# Patient Record
Sex: FEMALE | Race: WHITE | NOT HISPANIC OR LATINO | URBAN - METROPOLITAN AREA
[De-identification: names, ages, dates, MRNs, and addresses within clinical notes are randomized per-mention and may not be internally consistent; named-entity substitution may affect disease eponyms.]

---

## 2021-01-11 ENCOUNTER — HOSPITAL ENCOUNTER (OUTPATIENT)
Dept: GENERAL RADIOLOGY | Facility: HOSPITAL | Age: 86
Discharge: HOME | End: 2021-01-11
Attending: INTERNAL MEDICINE

## 2021-02-02 ENCOUNTER — OFFICE VISIT CONVERTED (OUTPATIENT)
Dept: PULMONOLOGY | Facility: CLINIC | Age: 86
End: 2021-02-02
Attending: NURSE PRACTITIONER

## 2021-02-25 ENCOUNTER — HOSPITAL ENCOUNTER (OUTPATIENT)
Dept: GENERAL RADIOLOGY | Facility: HOSPITAL | Age: 86
Discharge: HOME | End: 2021-02-25
Attending: NURSE PRACTITIONER

## 2021-05-28 VITALS
OXYGEN SATURATION: 98 % | RESPIRATION RATE: 15 BRPM | WEIGHT: 99 LBS | BODY MASS INDEX: 18.22 KG/M2 | HEART RATE: 127 BPM | TEMPERATURE: 96.4 F | SYSTOLIC BLOOD PRESSURE: 159 MMHG | DIASTOLIC BLOOD PRESSURE: 78 MMHG | HEIGHT: 62 IN

## 2021-05-28 NOTE — PROGRESS NOTES
Patient: ZBIGNIEW DON     Acct: [id number]     Report: #[id number]  UNIT #: [id number]     : 1935    Encounter Date:2021  PRIMARY CARE: Lennox West  ***Signed***  --------------------------------------------------------------------------------------------------------------------  Chief Complaint      Encounter Date      2021            Primary Care Provider      PERCY PALOMO CFR            Referring Provider      PERCY PALOMO CFR            Patient Complaint      Patient is complaining of      PT here for [facility] F/U COVID + (20), Resp. Failure, Hypertension            VITALS      Height 5 ft 2 in / 157.48 cm      Weight 99 lbs  / 44.728872 kg      BSA 1.42 m2      BMI 18.1 kg/m2      Temperature 96.4 F / 35.78 C - Temporal      Pulse 127      Respirations 15      Blood Pressure 159/78 Sitting, Left Arm      Pulse Oximetry 98%, nasal canula , 2 lpm            HPI      The patient is a 85 year old female recently hospitalized at [facility] from  to 2020 for COVID-19 pneumonia. The patient has    a past medical history significant for diabetes, hypertension and presented to     the ER due to weakness and a sodium level of 115. Apparently this appears to be     a recurrent issue for her as she was given hypertonic saline and now normal     saline and nephrology was consulted. COVID-19 returned back positive and the pat    ient required oxygen and was initiated on decadron and Remdesivir. The continued    to need increased oxygen requirements and started on  vancomycin and cefepime on    2020 due to left upper lobe pneumonia. The patient completed antibiotics     therapy during her stay and transferred to ICU on 2020 due to worsening     hypoxia and pulmonary and critical care were consulted to assist with care. The     patient was initially on Airvo but weaned on high flow nasal cannula. The     patient had a code status change to DNR. The  patient did however improved     extremely slowly and had a prolonged hospital stay. Nephrology was consulted for    hyponatremia and her sodium stabilized during her stay and she was transferred     out of ICU on 12/22/2020 and was intermittently diuresed. Palliative care was     discussed with the family. The patient was on high flow nasal cannula so they     considered LTAC but it was decided that it was too far away and her family     considered hospice. The patient initially auscultated back and forth between     Airvo and high flow nasal cannula an fortunately she was able to be weaned down     to 2 liters per minute via nasal cannula prior to discharge. The family did meet    with hospice at that time and the family elected to take the patient home with     home health. The patient's  was not ready to make a decision at that     time. The patient was given a steroid taper at discharge due to prolonged hos    pitalization and 2 rounds of steroids given during her hospital. The patient     does have advanced dementia, significant weakness and debility and remains a     high likelihood of readmission. The patient's granddaughter and son are present     with the patient in the office today and providing history. The patient states     she is feeling better since her hospital stay, the patient still gets short of     air that is moderate in severity, worse with exertion, improved with rest. The     patient is on 2 liters per minute oxygen via nasal cannula. The patient's     granddaughter states the patient was seen by her primary care provider Dr. West     recently and her blood pressure medication has been stopped at that time. The     patient denies any fever or chills, night sweats, hemoptysis,  purulent sputum     production, swollen glands in head and neck, unintentional weight loss, chest     pain or chest tightness, abdominal pain, nausea or vomiting or diarrhea. The     patient denies  any  headaches, myalgias, sore throat, changes in sense of taste     and smell any coronavirus or flu like symptoms.  The patient denies any leg     swelling, orthopnea or paroxysmal nocturnal dyspnea.  The patient continues to     need oxygen at 2 liters per minute via nasal cannula. The patient states she had    a repeat chest x-ray done by Dr. West on 01/11/21 showing increasing bilateral     airspace opacities concerning for multifocal pneumonia with right lung opacities    that were new from prior.              I reviewed the Review of Systems, medical, surgical and family history and agree    with those as entered.            ROS      Constitutional:  Denies: Fatigue, Fever, Weight gain, Weight loss, Chills, Insom    johnson, Other      Respiratory/Breathing:  Complains of: Shortness of air, Cough; Denies: Wheezing,    Hemoptysis, Pleuritic pain, Other      Endocrine:  Denies: Polydipsia, Polyuria, Heat/cold intolerance, Abnorml     menstrual pattern, Diabetes, Other      Eyes:  Denies: Blurred vision, Vision Changes, Other      Ears, nose, mouth, throat:  Denies: Mouth lesions, Thrush, Throat pain,     Hoarseness, Allergies/Hay Fever, Post Nasal Drip, Headaches, Recent Head Injury,    Nose Bleeding, Neck Stiffness, Thyroid Mass, Hearing Loss, Ear Fullness, Dry     Mouth, Nasal or Sinus Pain, Dry Lips, Nasal discharge, Nasal congestion, Other      Cardiovascular:  Denies: Palpitations, Syncope, Claudication, Chest Pain, Wake     up Gasping for air, Leg Swelling, Irregular Heart Rate, Cyanosis, Dyspnea on     Exertion, Other      Gastrointestinal:  Denies: Nausea, Constipation, Diarrhea, Abdominal pain,     Vomiting, Difficulty Swallowing, Reflux/Heartburn, Dysphagia, Jaundice,     Bloating, Melena, Bloody stools, Other      Genitourinary:  Denies: Urinary frequency, Incontinence, Hematuria, Urgency,     Nocturia, Dysuria, Testicular problems, Other      Musculoskeletal:  Denies: Joint Pain, Joint Stiffness, Joint  Swelling, Myalgias,    Other      Hematologic/lymphatic:  DENIES: Lymphadenopathy, Bruising, Bleeding tendencies,     Other      Neurological:  Denies: Headache, Numbness, Weakness, Seizures, Other      Psychiatric:  Denies: Anxiety, Appropriate Effect, Depression, Other      Sleep:  No: Excessive daytime sleep, Morning Headache?, Snoring, Insomnia?, Stop    breathing at sleep?, Other      Integumentary:  Denies: Rash, Dry skin, Skin Warm to Touch, Other      Immunologic/Allergic:  Denies: Latex allergy, Seasonal allergies, Asthma,     Urticaria, Eczema, Other      Immunization status:  No: Up to date            FAMILY/SOCIAL/MEDICAL HX      Surgical History:  Yes: Orthopedic Surgery (hip), Other Surgeries; No: Abdominal    Surgery, Appendectomy, Bladder Surgery, Bowel Surgery, CABG, Cholecystectomy,     Head Surgery, Oral Surgery, Vascular Surgery      Diabetes - Family Hx:  Brother      Is Father Still Living?:  No      Is Mother Still Living?:  No      Smoking status:  Former smoker (quit 30-40 yrs )      Medical History:  Yes: Deafness or Ringing Ears, Diabetes, High Blood Pressure;     No: Arthritis, Asthma, Chemotherapy/Cancer, Chronic Bronchitis/COPD, Congestive     Heart Failu, Seizures, Heart Attack, Hemorrhoids/Rectal Prob, Shortness Of     Breath, Miscellaneous Medical/oth      Psychiatric History      None            PREVENTION      Hx Influenza Vaccination:  Yes      Date Influenza Vaccine Given:  Dec 31, 2020      2 or More Falls in Past Year?:  No      Fall Past Year with Injury?:  No      Hx Pneumococcal Vaccination:  No      Encouraged to follow-up with:  PCP regarding preventative exams.      Chart initiated by      Janelle Renee MA            ALLERGIES/MEDICATIONS      Allergies:        Coded Allergies:             NO KNOWN ALLERGIES (Unverified , 2/2/21)      Medications    Last Reconciled on 2/2/21 14:24 by JEAN WELCH,       Aspirin Chew (Aspirin Chew) 81 Mg Tab.chew      81 MG PO  QDAY, #30 TAB.CHEW 0 Refills         Prov: LILIANA GONZALEZ         12/28/20       MDI-Albuterol (Proair HFA) 8.5 Gm Hfa.aer.ad      2 PUFFS INH Q4H PRN for SHORTNESS OF BREATH, #1 MDI 0 Refills         Prov: LILIANA GONZALEZ         12/28/20       Simvastatin (Simvastatin*) 10 Mg Tablet      10 MG PO HS, TAB         Reported         2/21/14       metFORMIN HCl (metFORMIN HCl) 500 Mg Tablet      500 MG PO QDAY, TAB         Reported         2/20/14      Current Medications      Current Medications Reviewed 2/2/21            EXAM      Vital Signs Reviewed      Gen: WDWN, Alert, NAD.        HEENT:  PERRL, EOMI.  OP, nares clear, no sinus tenderness.      Neck:  Supple, no JVD, no thyromegaly.      Lymph: No axillary, cervical, supraclavicular lymphadenopathy noted bilaterally.      Chest:  Bilateral diminished breath sounds, no wheezing, crackles or rhonchi     appreciated, normal work of breathing noted. The patient is able to speak full     sentences without difficulty.        CV:  RRR, no MGR, pulses 2+, equal.      Abd:  Soft, NT, ND, + BS, no HSM.      EXT:  No clubbing, no cyanosis, no edema, no joint tenderness.       Neuro:  A  Skin: No rashes or lesions.      Vtials      Vitals:             Height 5 ft 2 in / 157.48 cm           Weight 99 lbs  / 44.251086 kg           BSA 1.42 m2           BMI 18.1 kg/m2           Temperature 96.4 F / 35.78 C - Temporal           Pulse 127           Respirations 15           Blood Pressure 159/78 Sitting, Left Arm           Pulse Oximetry 98%, nasal canula , 2 lpm            REVIEW      Results Reviewed      PCCS Results Reviewed?:  Yes Prev Lab Results, Yes Prev Radiology Results, Yes     Previous Mecial Records      Lab Results      I personally reviewed the patient's discharge summary from recent     hospitalization.      Radiographic Results               [facility]                PACS RADIOLOGY REPORT             Patient: ZBIGNIEW DON   Acct: #[id number]   Report: #[id number]            UNIT #: [id number]    DOS: 12/15/20 0647      INSURANCE:MEDICARE PART A   LOCATION:[address] [place], [place] [address]-   : 1935            PROVIDERS      ADMITTING:  Kev Disla   ATTENDING: Kev Disla      FAMILY:  Lennox West   ORDERING:  PRANAV WILKINS         OTHER:    DICTATING:  Gilles Sung MD            REQ #:[id number]   EXAM:CHWO - CT CHEST without CONTRAST      REASON FOR EXAM:  DYLAN infiltrate, ? loculated effusion      REASON FOR VISIT:  SEVERE HYPONATREMIA            *******Signed******         PROCEDURE:   CT CHEST WITHOUT CONTRAST             COMPARISON:   [facility], , CXR PORTABLE, 2020, 15:53.             INDICATIONS:   DYLAN infiltrate, ? loculated effusion             TECHNIQUE:   CT images were created without the administration of contrast     material.               PROTOCOL:     Standard imaging protocol performed                RADIATION:     DLP: 303mGy*cm          Automated exposure control was utilized to minimize radiation dose.              FINDINGS:         Chest CT demonstrates there are prominent mediastinal lymph nodes.  The largest     AP window lymph       node measures 0.9 x 0.7 centimeters.  There is prominent subcarinal lymph nodes     some which are       calcified.  No pericardial effusion.  There may be a small left pleural effusion    measuring up to       about 9 millimeters in thickness.  Contrast not utilized.  Mild atherosclerosis.     No upper       abdominal finding is seen.  There is dense airspace opacity in both lower lobes.     There is patchy       bilateral opacities and septal thickening of the ground-glass opacity that has a    crazy paving type       appearance.  This is most extensive peripherally in the left upper lobe.  There     is similar opacity       in the left lower lobe right upper lobe and right lower lobe and a small amount     of opacity in the        right middle lobe.  There is a small right lower lobe lung nodule measuring 4     millimeters.  No       acute osseous findings.             CONCLUSION:         1. Small left pleural effusion measuring up to about 0.9 centimeters in [facility]    kness.  No large or       loculated effusion.      2. Diffuse bilateral upper and lower lobe opacities greater in the left upper.      Much of this       opacity has a crazy paving appearance.  Differential considerations include     infectious and       noninfectious causes.  Infectious causes such as bacterial pneumonia, acute     interstitial pneumonia       and  viral pneumonia including COVID 19.  Non infectious causes may include     pulmonary edema and       ARDS.  Recommend continued follow-up to ensure resolution.      3. More dependent airspace opacities seen in bilateral lower lobes that could     reflect atelectasis       and/or pneumonia.      4. Small right lower lobe lung nodule.                ODALIS VEGA MD             Electronically Signed and Approved By: ODALIS VEGA MD on 12/15/2020 at     16:46                               Until signed, this is an unconfirmed preliminary report that may contain      errors and is subject to change.                                              SCHJO:      D:12/15/20 1622                     [facility]                [place]ACS RADIOLOGY REPORT            Patient: ZBIGNIEW DON   Acct: #[id number]   Report: #[id number]            UNIT #: [id number]    DOS: 21 1212      INSURANCE:MEDICARE PART A   LOCATION:[facility]     : 1935            PROVIDERS      ADMITTING:     ATTENDING: Lennox West      FAMILY:  NONE,MD   ORDERING:  Lennox West         OTHER:    DICTATING:  Tarik Pisano MD            REQ #:[id number]   EXAM:CXR2 - CHEST 2V AP PA LAT      REASON FOR EXAM:        REASON FOR VISIT:  PNEUMONIA            *******Signed******          PROCEDURE:   CHEST AP/PA AND LATERAL             COMPARISON:   [facility], CR, CHEST AP/PA 1 VIEW, 12/24/2020,     10:48.             INDICATIONS:   PNEUMONIA. HOSPITALIZED FROM DECEMBER 5TH TO DECEMBER 28TH LAST     YEAR. STILL HAVING       SHORTNESS AND BREATH AND IS ON OXYGEN NOW.             FINDINGS:         There is increasing right mid lung and right basilar opacity compared to prior     exam.  There is       persistent multifocal left mid lung and left basilar opacity.  Cardiac and     mediastinal contours are       within normal limits.  Regional skeleton is unremarkable.  No aggressive     appearing lytic or       sclerotic bone lesions.             CONCLUSION:         1. Increasing, bilateral airspace opacities concerning for multifocal pneumonia.     The right lung       opacities are new from prior.              RUMA EATON MD             Electronically Signed and Approved By: RUMA EATON MD on 1/11/2021 at 12:36                               Until signed, this is an unconfirmed preliminary report that may contain      errors and is subject to change.                                              DUNS1:      D:01/11/21 1236            Assessment      COVID-19 - U07.1            Dyspnea - R06.00            Notes      Renewed Medications      * MDI-Albuterol (Proair HFA) 8.5 GM HFA.AER.AD:         From: 2 PUFFS INH Q4H PRN SHORTNESS OF BREATH #1         To: 2 PUFFS INH Q4H PRN SHORTNESS OF BREATH 30 Days #1      Discontinued Medications      * predniSONE (Deltasone) 10 MG TABLET: 40 MG PO QDAY #30      New Diagnostics      * Chest W/O Cont CT, SCHEDULED PROCEDURE         Dx: COVID-19 - U07.1      IMPRESSION:      1. History of acute hypoxemic respiratory failure requiring Airvo and BiPAP now     on 2 liters per minute oxygen via nasal cannula.      2. COVID-19 pneumonia.       3. Acute cardiogenic pulmonary edema.       4. Diastolic heart failure.       5. Tiny left pleural effusion.        6. Hyponatremia resolved.       7. History of type II diabetes mellitus.      8. History of hypertension.       9. History of urinary tract infection.       10. Weakness.       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             PLAN:      1. Continue ProAir inhaler as needed.       2. I offered to write for a nebulizer machine and albuterol nebulizer to use at     home however the patient's granddaughter states the patient does not have     prescription insurance and would need to check with her insurance company and     notify our office.       3. The patient's heart rate is  tachycardic in the office today. The patient's     granddaughter states the patient is anxious being at the doctor office however I    advised them to call their primary care provider today at the patient's blood     pressure is slightly elevated and the patient need to be restarted on     medication. Both the patient's son and granddaughter verbalized understanding     and compliance.       4. The patient is advised to call the office, call 911 or go to the ER for any     new or worsening symptoms.       5. The patient reports she is up to date with flu and pneumonia vaccines. The     patient is advised to follow CDC recommendations such as social distancing,     wearing a mask and washing hand for at least 20 seconds.      6. Continue oxygen at 2 liters per minute to keep oxygen saturation at or above     89%.       7. I will repeat a CT scan of the chest to ensure resolution of recent     pneumonia.       8. Follow up with Dr. Adams in 4-6 weeks sooner if needed.            Patient Education      Patient Education Provided:  Acute Bronchitis      Time Spent:  > 50% /Coord Care            Electronically signed by JEAN WELCH [facility]  02/05/2021 12:55       Disclaimer: Converted document may not contain table formatting or lab diagrams. Please see [facility] for the authenticated document.

## 2023-09-18 ENCOUNTER — APPOINTMENT (OUTPATIENT)
Dept: GENERAL RADIOLOGY | Facility: HOSPITAL | Age: 88
End: 2023-09-18
Payer: MEDICARE

## 2023-09-18 ENCOUNTER — HOSPITAL ENCOUNTER (EMERGENCY)
Facility: HOSPITAL | Age: 88
Discharge: HOME | End: 2023-09-18
Attending: EMERGENCY MEDICINE | Admitting: EMERGENCY MEDICINE
Payer: MEDICARE

## 2023-09-18 VITALS
DIASTOLIC BLOOD PRESSURE: 53 MMHG | SYSTOLIC BLOOD PRESSURE: 146 MMHG | TEMPERATURE: 97.7 F | BODY MASS INDEX: 14.57 KG/M2 | HEIGHT: 64 IN | HEART RATE: 74 BPM | WEIGHT: 85.32 LBS | OXYGEN SATURATION: 98 % | RESPIRATION RATE: 19 BRPM

## 2023-09-18 DIAGNOSIS — F03.C0 SEVERE DEMENTIA, UNSPECIFIED DEMENTIA TYPE, UNSPECIFIED WHETHER BEHAVIORAL, PSYCHOTIC, OR MOOD DISTURBANCE OR ANXIETY: Primary | ICD-10-CM

## 2023-09-18 DIAGNOSIS — Z51.5 END OF LIFE CARE: ICD-10-CM

## 2023-09-18 LAB
ALBUMIN SERPL-MCNC: 2.6 G/DL (ref 3.5–5.2)
ALBUMIN/GLOB SERPL: 1.3 G/DL
ALP SERPL-CCNC: 102 U/L (ref 39–117)
ALT SERPL W P-5'-P-CCNC: 7 U/L (ref 1–33)
ANION GAP SERPL CALCULATED.3IONS-SCNC: 8.1 MMOL/L (ref 5–15)
AST SERPL-CCNC: 13 U/L (ref 1–32)
BASOPHILS # BLD AUTO: 0.03 10*3/MM3 (ref 0–0.2)
BASOPHILS NFR BLD AUTO: 0.3 % (ref 0–1.5)
BILIRUB SERPL-MCNC: 0.5 MG/DL (ref 0–1.2)
BILIRUB UR QL STRIP: NEGATIVE
BUN SERPL-MCNC: 12 MG/DL (ref 8–23)
BUN/CREAT SERPL: 42.9 (ref 7–25)
BURR CELLS BLD QL SMEAR: NORMAL
CALCIUM SPEC-SCNC: 8.2 MG/DL (ref 8.6–10.5)
CHLORIDE SERPL-SCNC: 91 MMOL/L (ref 98–107)
CLARITY UR: CLEAR
CO2 SERPL-SCNC: 27.9 MMOL/L (ref 22–29)
COLOR UR: YELLOW
CREAT SERPL-MCNC: 0.28 MG/DL (ref 0.57–1)
DEPRECATED RDW RBC AUTO: 47.2 FL (ref 37–54)
EGFRCR SERPLBLD CKD-EPI 2021: 103.9 ML/MIN/1.73
EOSINOPHIL # BLD AUTO: 0.02 10*3/MM3 (ref 0–0.4)
EOSINOPHIL NFR BLD AUTO: 0.2 % (ref 0.3–6.2)
ERYTHROCYTE [DISTWIDTH] IN BLOOD BY AUTOMATED COUNT: 19.5 % (ref 12.3–15.4)
GLOBULIN UR ELPH-MCNC: 2 GM/DL
GLUCOSE SERPL-MCNC: 109 MG/DL (ref 65–99)
GLUCOSE UR STRIP-MCNC: NEGATIVE MG/DL
HCT VFR BLD AUTO: 28.8 % (ref 34–46.6)
HGB BLD-MCNC: 8.5 G/DL (ref 12–15.9)
HGB UR QL STRIP.AUTO: NEGATIVE
HOLD SPECIMEN: NORMAL
HOLD SPECIMEN: NORMAL
IMM GRANULOCYTES # BLD AUTO: 0.07 10*3/MM3 (ref 0–0.05)
IMM GRANULOCYTES NFR BLD AUTO: 0.8 % (ref 0–0.5)
KETONES UR QL STRIP: ABNORMAL
LEUKOCYTE ESTERASE UR QL STRIP.AUTO: NEGATIVE
LYMPHOCYTES # BLD AUTO: 1.28 10*3/MM3 (ref 0.7–3.1)
LYMPHOCYTES NFR BLD AUTO: 14.2 % (ref 19.6–45.3)
MAGNESIUM SERPL-MCNC: 1.9 MG/DL (ref 1.6–2.4)
MCH RBC QN AUTO: 20.2 PG (ref 26.6–33)
MCHC RBC AUTO-ENTMCNC: 29.5 G/DL (ref 31.5–35.7)
MCV RBC AUTO: 68.4 FL (ref 79–97)
MICROCYTES BLD QL: NORMAL
MONOCYTES # BLD AUTO: 0.67 10*3/MM3 (ref 0.1–0.9)
MONOCYTES NFR BLD AUTO: 7.4 % (ref 5–12)
NEUTROPHILS NFR BLD AUTO: 6.96 10*3/MM3 (ref 1.7–7)
NEUTROPHILS NFR BLD AUTO: 77.1 % (ref 42.7–76)
NITRITE UR QL STRIP: NEGATIVE
NRBC BLD AUTO-RTO: 0 /100 WBC (ref 0–0.2)
OVALOCYTES BLD QL SMEAR: NORMAL
PH UR STRIP.AUTO: 7 [PH] (ref 5–8)
PLATELET # BLD AUTO: 252 10*3/MM3 (ref 140–450)
PMV BLD AUTO: 8.4 FL (ref 6–12)
POTASSIUM SERPL-SCNC: 3.7 MMOL/L (ref 3.5–5.2)
PROT SERPL-MCNC: 4.6 G/DL (ref 6–8.5)
PROT UR QL STRIP: ABNORMAL
RBC # BLD AUTO: 4.21 10*6/MM3 (ref 3.77–5.28)
SMALL PLATELETS BLD QL SMEAR: ADEQUATE
SODIUM SERPL-SCNC: 127 MMOL/L (ref 136–145)
SP GR UR STRIP: 1.01 (ref 1–1.03)
TROPONIN T SERPL HS-MCNC: 19 NG/L
UROBILINOGEN UR QL STRIP: ABNORMAL
WBC MORPH BLD: NORMAL
WBC NRBC COR # BLD: 9.03 10*3/MM3 (ref 3.4–10.8)
WHOLE BLOOD HOLD COAG: NORMAL
WHOLE BLOOD HOLD SPECIMEN: NORMAL

## 2023-09-18 PROCEDURE — 83735 ASSAY OF MAGNESIUM: CPT | Performed by: EMERGENCY MEDICINE

## 2023-09-18 PROCEDURE — 99283 EMERGENCY DEPT VISIT LOW MDM: CPT

## 2023-09-18 PROCEDURE — 81003 URINALYSIS AUTO W/O SCOPE: CPT | Performed by: EMERGENCY MEDICINE

## 2023-09-18 PROCEDURE — 85025 COMPLETE CBC W/AUTO DIFF WBC: CPT | Performed by: EMERGENCY MEDICINE

## 2023-09-18 PROCEDURE — 85007 BL SMEAR W/DIFF WBC COUNT: CPT | Performed by: EMERGENCY MEDICINE

## 2023-09-18 PROCEDURE — 84484 ASSAY OF TROPONIN QUANT: CPT | Performed by: EMERGENCY MEDICINE

## 2023-09-18 PROCEDURE — 25010000002 LORAZEPAM PER 2 MG: Performed by: EMERGENCY MEDICINE

## 2023-09-18 PROCEDURE — 80053 COMPREHEN METABOLIC PANEL: CPT | Performed by: EMERGENCY MEDICINE

## 2023-09-18 PROCEDURE — 71045 X-RAY EXAM CHEST 1 VIEW: CPT

## 2023-09-18 PROCEDURE — 96374 THER/PROPH/DIAG INJ IV PUSH: CPT

## 2023-09-18 RX ORDER — LORAZEPAM 2 MG/ML
0.5 CONCENTRATE ORAL EVERY 4 HOURS PRN
Status: DISCONTINUED | OUTPATIENT
Start: 2023-09-18 | End: 2023-09-18 | Stop reason: HOSPADM

## 2023-09-18 RX ORDER — MORPHINE SULFATE 10 MG/.5ML
5 SOLUTION ORAL
Status: DISCONTINUED | OUTPATIENT
Start: 2023-09-18 | End: 2023-09-18 | Stop reason: HOSPADM

## 2023-09-18 RX ORDER — MORPHINE SULFATE 100 MG/5ML
5 SOLUTION ORAL
Qty: 15 ML | Refills: 0 | Status: SHIPPED | OUTPATIENT
Start: 2023-09-18 | End: 2023-09-18 | Stop reason: SDUPTHER

## 2023-09-18 RX ORDER — LORAZEPAM 2 MG/ML
0.5 CONCENTRATE ORAL EVERY 4 HOURS PRN
Qty: 30 ML | Refills: 0 | Status: SHIPPED | OUTPATIENT
Start: 2023-09-18

## 2023-09-18 RX ORDER — LORAZEPAM 2 MG/ML
0.5 CONCENTRATE ORAL EVERY 4 HOURS PRN
Qty: 10 ML | Refills: 0 | Status: SHIPPED | OUTPATIENT
Start: 2023-09-18 | End: 2023-09-18 | Stop reason: SDUPTHER

## 2023-09-18 RX ORDER — LORAZEPAM 2 MG/ML
1 INJECTION INTRAMUSCULAR ONCE
Status: COMPLETED | OUTPATIENT
Start: 2023-09-18 | End: 2023-09-18

## 2023-09-18 RX ORDER — LORAZEPAM 2 MG/ML
0.5 INJECTION INTRAMUSCULAR ONCE
Status: DISCONTINUED | OUTPATIENT
Start: 2023-09-18 | End: 2023-09-18

## 2023-09-18 RX ORDER — MORPHINE SULFATE 100 MG/5ML
5 SOLUTION ORAL
Qty: 30 ML | Refills: 0 | Status: SHIPPED | OUTPATIENT
Start: 2023-09-18

## 2023-09-18 RX ORDER — SODIUM CHLORIDE 0.9 % (FLUSH) 0.9 %
10 SYRINGE (ML) INJECTION AS NEEDED
Status: DISCONTINUED | OUTPATIENT
Start: 2023-09-18 | End: 2023-09-18 | Stop reason: HOSPADM

## 2023-09-18 RX ADMIN — SODIUM CHLORIDE 500 ML: 9 INJECTION, SOLUTION INTRAVENOUS at 08:07

## 2023-09-18 RX ADMIN — LORAZEPAM 1 MG: 2 INJECTION INTRAMUSCULAR; INTRAVENOUS at 08:19

## 2023-09-18 NOTE — DISCHARGE INSTRUCTIONS
We are sending Hue home today as you have voiced desire to avoid medical admission and treatment at this time.  Her care will be end-of-life care from this point forward at home based on your request.  If you change your mind and want to seek further medical intervention please return to the emergency department.

## 2023-09-18 NOTE — ED PROVIDER NOTES
"Time: 7:46 AM EDT  Date of encounter:  2023  Independent Historian/Clinical History and Information was obtained by:   Patient and Family    History is limited by: Dementia    Chief Complaint: Agitation, generalized body aches      History of Present Illness:  Patient is a 88 y.o. year old female who presents to the emergency department for evaluation of agitation and body aches.  Patient has baseline dementia and family states she seems to be tender to touch everywhere.  Alert to person only at baseline.  Patient denies any current pain but history is very limited secondary to dementia.  Family states that the patient has not had a fall in the past several weeks.  Reportedly her   1 month ago.  Patient is DNR and family is seeking some form of hospice versus nursing care/end-of-life care.  They feel unable to take care of her any longer.    HPI    Patient Care Team  Primary Care Provider: Lennox West MD    Past Medical History:     Not on File  Past Medical History:   Diagnosis Date    Dementia     Diabetes mellitus     Hypertension      Past Surgical History:   Procedure Laterality Date    TOTAL HIP ARTHROPLASTY      unknown per son, possibly right     History reviewed. No pertinent family history.    Home Medications:  Prior to Admission medications    Not on File        Social History:   Social History     Tobacco Use    Smoking status: Former     Types: Cigarettes   Substance Use Topics    Alcohol use: Never    Drug use: Never         Review of Systems:  Review of Systems   Unable to perform ROS: Dementia      Physical Exam:  /53   Pulse 74   Temp 97.7 °F (36.5 °C) (Axillary)   Resp 19   Ht 162.6 cm (64\")   Wt 38.7 kg (85 lb 5.1 oz)   SpO2 98%   BMI 14.64 kg/m²     Physical Exam  Vitals and nursing note reviewed.   Constitutional:       General: She is awake.      Appearance: Normal appearance.   HENT:      Head: Normocephalic and atraumatic.      Nose: Nose normal.      " Mouth/Throat:      Mouth: Mucous membranes are moist.   Eyes:      Extraocular Movements: Extraocular movements intact.      Pupils: Pupils are equal, round, and reactive to light.   Cardiovascular:      Rate and Rhythm: Normal rate and regular rhythm.      Heart sounds: Normal heart sounds.   Pulmonary:      Effort: Pulmonary effort is normal. No respiratory distress.      Breath sounds: Normal breath sounds. No wheezing, rhonchi or rales.   Abdominal:      General: Bowel sounds are normal.      Palpations: Abdomen is soft.      Tenderness: There is no abdominal tenderness. There is no guarding or rebound.      Comments: No rigidity   Musculoskeletal:         General: No tenderness. Normal range of motion.      Cervical back: Normal range of motion and neck supple.      Comments: Cachectic   Skin:     General: Skin is warm and dry.      Coloration: Skin is not jaundiced.   Neurological:      General: No focal deficit present.      Cranial Nerves: No cranial nerve deficit.      Motor: No weakness.      Comments: Alert to person only   Psychiatric:      Comments: Mildly agitated                Procedures:  Procedures      Medical Decision Making:      Comorbidities that affect care:    Dementia , type 2 diabetes, hyponatremia, hypertension, protein calorie malnutrition    External Notes reviewed:    Encounter review: Hospital admission 12/5/2020.  Final diagnoses: COVID-19 pneumonia, type 2 diabetes with hyperglycemia, severe hyponatremia.      The following orders were placed and all results were independently analyzed by me:  Orders Placed This Encounter   Procedures    XR Chest 1 View    [place] Draw    Comprehensive Metabolic Panel    Single High Sensitivity Troponin T    Magnesium    CBC Auto Differential    Urinalysis With Culture If Indicated - Urine, Clean Catch    Scan Slide    Undress & Gown    Continuous Pulse Oximetry    Vital Signs    Insert Indwelling Urinary Catheter    Inpatient Palliative Care Team  Consult    CBC & Differential    Green Top (Gel)    Lavender Top    Gold Top - SST    Light Blue Top       Medications Given in the Emergency Department:  Medications   sodium chloride 0.9 % bolus 500 mL (0 mL Intravenous Stopped 9/18/23 1049)   LORazepam (ATIVAN) injection 1 mg (1 mg Intravenous Given 9/18/23 0819)        ED Course:         Labs:    Lab Results (last 24 hours)       Procedure Component Value Units Date/Time    CBC & Differential [[id number]]  (Abnormal) Collected: 09/18/23 0750    Specimen: Blood Updated: 09/18/23 0843    Narrative:      The following orders were created for panel order CBC & Differential.  Procedure                               Abnormality         Status                     ---------                               -----------         ------                     CBC Auto Differential[[id number]]        Abnormal            Final result               Scan Slide[[id number]]                                       Final result                 Please view results for these tests on the individual orders.    Comprehensive Metabolic Panel [[id number]]  (Abnormal) Collected: 09/18/23 0750    Specimen: Blood Updated: 09/18/23 0830     Glucose 109 mg/dL      BUN 12 mg/dL      Creatinine 0.28 mg/dL      Sodium 127 mmol/L      Potassium 3.7 mmol/L      Chloride 91 mmol/L      CO2 27.9 mmol/L      Calcium 8.2 mg/dL      Total Protein 4.6 g/dL      Albumin 2.6 g/dL      ALT (SGPT) 7 U/L      AST (SGOT) 13 U/L      Alkaline Phosphatase 102 U/L      Total Bilirubin 0.5 mg/dL      Globulin 2.0 gm/dL      A/G Ratio 1.3 g/dL      BUN/Creatinine Ratio 42.9     Anion Gap 8.1 mmol/L      eGFR 103.9 mL/min/1.73     Narrative:      GFR Normal >60  Chronic Kidney Disease <60  Kidney Failure <15    The GFR formula is only valid for adults with stable renal function between ages 18 and 70.    Single High Sensitivity Troponin T [[id number]]  (Abnormal) Collected: 09/18/23 0750    Specimen: Blood Updated: 09/18/23  0830     HS Troponin T 19 ng/L     Narrative:      High Sensitive Troponin T Reference Range:  <10.0 ng/L- Negative Female for AMI  <15.0 ng/L- Negative Male for AMI  >=10 - Abnormal Female indicating possible myocardial injury.  >=15 - Abnormal Male indicating possible myocardial injury.   Clinicians would have to utilize clinical acumen, EKG, Troponin, and serial changes to determine if it is an Acute Myocardial Infarction or myocardial injury due to an underlying chronic condition.         Magnesium [[id number]]  (Normal) Collected: 09/18/23 0750    Specimen: Blood Updated: 09/18/23 0830     Magnesium 1.9 mg/dL     CBC Auto Differential [[id number]]  (Abnormal) Collected: 09/18/23 0750    Specimen: Blood Updated: 09/18/23 0843     WBC 9.03 10*3/mm3      RBC 4.21 10*6/mm3      Hemoglobin 8.5 g/dL      Hematocrit 28.8 %      MCV 68.4 fL      MCH 20.2 pg      MCHC 29.5 g/dL      RDW 19.5 %      RDW-SD 47.2 fl      MPV 8.4 fL      Platelets 252 10*3/mm3      Neutrophil % 77.1 %      Lymphocyte % 14.2 %      Monocyte % 7.4 %      Eosinophil % 0.2 %      Basophil % 0.3 %      Immature Grans % 0.8 %      Neutrophils, Absolute 6.96 10*3/mm3      Lymphocytes, Absolute 1.28 10*3/mm3      Monocytes, Absolute 0.67 10*3/mm3      Eosinophils, Absolute 0.02 10*3/mm3      Basophils, Absolute 0.03 10*3/mm3      Immature Grans, Absolute 0.07 10*3/mm3      nRBC 0.0 /100 WBC     Narrative:      Appended report. These results have been appended to a previously verified report.    Scan Slide [[id number]] Collected: 09/18/23 0750    Specimen: Blood Updated: 09/18/23 0843     Crenated RBC's Slight/1+     Microcytes Mod/2+     Ovalocytes Slight/1+     WBC Morphology Normal     Platelet Estimate Adequate    Urinalysis With Culture If Indicated - Urine, Clean Catch [[id number]]  (Abnormal) Collected: 09/18/23 0917    Specimen: Urine, Clean Catch Updated: 09/18/23 0923     Color, UA Yellow     Appearance, UA Clear     pH, UA 7.0     Specific  Gravity, UA 1.015     Glucose, UA Negative     Ketones, UA 15 mg/dL (1+)     Bilirubin, UA Negative     Blood, UA Negative     Protein, UA Trace     Leuk Esterase, UA Negative     Nitrite, UA Negative     Urobilinogen, UA 4.0 E.U./dL    Narrative:      In absence of clinical symptoms, the presence of pyuria, bacteria, and/or nitrites on the urinalysis result does not correlate with infection.  Urine microscopic not indicated.             Imaging:    XR Chest 1 View    Result Date: 9/18/2023  PROCEDURE: XR CHEST 1 VW  COMPARISON: [facility], CR, CHEST PA/AP & LAT 2V, 1/11/2021, 12:33.  INDICATIONS: Weak/Dizzy/AMS triage protocol  FINDINGS:  Heart size and pulmonary vasculature within normal limits.  Lungs grossly clear other than scarring in the left mid lung.  Costophrenic angles sharp       No acute cardiopulmonary process demonstrated       DENISE CHIU MD       Electronically Signed and Approved By: DENISE CHIU MD on 9/18/2023 at 9:28                Differential Diagnosis and Discussion:    Weakness: Based on the patient's history, signs, and symptoms, the diffential diagnosis includes but is not limited to meningitis, stroke, sepsis, subarachnoid hemorrhage, intracranial bleeding, encephalitis, acute uti, dehydration, MS, myasthenia gravis, Guillan [place], migraine variant, neuromuscular disorders vertigo, electrolyte imbalance, and metabolic disorders.    All labs were reviewed and interpreted by me.  All X-rays impressions were independently interpreted by me.    MDM     Amount and/or Complexity of Data Reviewed  Clinical lab tests: reviewed             Patient Care Considerations:    CT HEAD: I considered ordering a noncontrast CT of the head, however patient has dementia and no recent head trauma.  She denies headache.  She has no focal deficits on exam.      Consultants/Shared Management Plan:    Palliative care: Palliative care was consulted and they did evaluate the patient here  in the emergency department.    Social Determinants of Health:    Patient has presented with family members who are responsible, reliable and will ensure follow up care.      Disposition and Care Coordination:    I advised the patient that in my opinion through evaluation of the history, physical, and objective data admission to the hospital is warranted. However, the patient/caretaker has declined admission understanding the risks of discharge.    I have explained the patient´s condition, diagnoses and treatment plan based on the information available to me at this time. I have answered questions and addressed any concerns. The patient has a good  understanding of the patient´s diagnosis, condition, and treatment plan as can be expected at this point. The vital signs have been stable. The patient´s condition is stable and appropriate for discharge from the emergency department.      The patient will pursue further outpatient evaluation with the primary care physician or other designated or consulting physician as outlined in the discharge instructions. They are agreeable to this plan of care and follow-up instructions have been explained in detail. The patient has received these instructions in written format and have expressed an understanding of the discharge instructions. The patient is aware that any significant change in condition or worsening of symptoms should prompt an immediate return to this or the closest emergency department or call to 911.    Final diagnoses:   Severe dementia, unspecified dementia type, unspecified whether behavioral, psychotic, or mood disturbance or anxiety   End of life care        ED Disposition       ED Disposition   Discharge    Condition   Stable    Comment   --               This medical record created using voice recognition software.             Arcadio Carr MD  09/18/23 [id number]

## 2023-09-18 NOTE — ED NOTES
Per familys wishes they want us to hold off on straight catheter for urination. Pts family wants to talk to someone about education for end of life care before doing all sorts of tests due to patient being agitated, [place] aware.

## 2023-09-18 NOTE — ED NOTES
"PER PT'S FAMILY, THEY ARE WANTING TO HOLD OFF ON THE EKG UNTIL SHE HAS \"CALMED DOWN A BIT\". DR. SALAZAR AND RN WERE NOTIFIED.   "

## 2023-09-18 NOTE — CONSULTS
Purpose of the visit was to evaluate for: support for patient/family, hospice referral/discussion, and comfort care. Spoke with MD, RN, patient, and family and discussed palliative care, goals of care, [facility], and clarify code status.      Assessment:  Patient is currently located in ED, palliative care nurse collaborated with primary RN, MD and CM RN regarding patient's current condition.  She lives at home with her family and has dementia.  She has had increased weakness, decreased appetite and increased agitation.  She has a medical history of dementia.  She was alert to herself only before this visit.  Her spouse  one month ago.  Patient is currently resting on room air with no complaints of pain or shortness of air.  She has been medicated with ativan due to having increased agitation earlier.      Met with patient's son and daughter in law.  We discussed her current condition, treatment options, resuscitation and hospice services.  Patient's son is her POA and stated she has a living will and they do not want to seek aggressive treatment.  They want to focus on comfort and end of life care.  Discussed hospice services with them and they wanted to discharge home with hospice.  Notified MD and requested a jean-baptiste catheter and comfort medications to be ordered.  Patient will transport home via EMS, medications for comfort will be sent to [facility] pharmacy.  The patient has a hospital bed at home.      Hospice referral was made.  They can see the patient at 130pm today.  She will transport home via EMS and EMS DNR was completed.      Recommendations/Plan: [facility] referral.    Tasks Completed: EMS DNR, Code Status clarification, and Emotional Support.      Mary ROSALES, RN, CHPN